# Patient Record
Sex: FEMALE
[De-identification: names, ages, dates, MRNs, and addresses within clinical notes are randomized per-mention and may not be internally consistent; named-entity substitution may affect disease eponyms.]

---

## 2023-02-03 ENCOUNTER — NURSE TRIAGE (OUTPATIENT)
Dept: OTHER | Facility: CLINIC | Age: 63
End: 2023-02-03

## 2023-02-03 NOTE — TELEPHONE ENCOUNTER
Location of patient: [place]    Subjective: Caller states \"lI was playing pickleball and fell on my knees, but my left knee is swollen, sore and bruised\"     Current Symptoms: left knee injury with swelling and bruising. on top of knee. Can walk on it, burning with bending and touching. Tingling with touch. .    Onset: around 1100 this morning sudden, unchanged    Associated Symptoms: reduced activity    Pain Severity: 2-6/10; dull, tender, burning; constant, waxing and waning    Temperature: n/a     What has been tried: ice,tylenol, wrapped    LMP: NA Pregnant: NA    Recommended disposition: See PCP within 24 Hours    Care advice provided, patient verbalizes understanding; denies any other questions or concerns; instructed to call back for any new or worsening symptoms. Is going to wait and see how she feels tomorrow before going to the doctor. Gave her some names of [facility] in network in the area she needed ([id number]). This triage is a result of a call to [address] [place] [address] of [employer]. Please do not respond to the triage nurse through this encounter. Any subsequent communication should be directly with the patient.         Reason for Disposition   [1] High-risk adult (e.g., age > 61 years, osteoporosis, chronic steroid use) AND [2] limping    Protocols used: Knee Injury-ADULT-AH